# Patient Record
Sex: FEMALE | Race: WHITE | NOT HISPANIC OR LATINO
[De-identification: names, ages, dates, MRNs, and addresses within clinical notes are randomized per-mention and may not be internally consistent; named-entity substitution may affect disease eponyms.]

---
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
## 2021-09-09 PROBLEM — Z00.00 ENCOUNTER FOR PREVENTIVE HEALTH EXAMINATION: Status: ACTIVE | Noted: 2021-09-09

## 2022-07-10 ENCOUNTER — FORM ENCOUNTER (OUTPATIENT)
Age: 22
End: 2022-07-10

## 2022-07-11 ENCOUNTER — APPOINTMENT (OUTPATIENT)
Dept: ORTHOPEDIC SURGERY | Facility: CLINIC | Age: 22
End: 2022-07-11

## 2022-07-11 ENCOUNTER — APPOINTMENT (OUTPATIENT)
Dept: MRI IMAGING | Facility: CLINIC | Age: 22
End: 2022-07-11

## 2022-07-11 VITALS — WEIGHT: 160 LBS | BODY MASS INDEX: 27.31 KG/M2 | HEIGHT: 64 IN

## 2022-07-11 DIAGNOSIS — Z86.79 PERSONAL HISTORY OF OTHER DISEASES OF THE CIRCULATORY SYSTEM: ICD-10-CM

## 2022-07-11 DIAGNOSIS — Z78.9 OTHER SPECIFIED HEALTH STATUS: ICD-10-CM

## 2022-07-11 PROCEDURE — 73590 X-RAY EXAM OF LOWER LEG: CPT | Mod: RT

## 2022-07-11 PROCEDURE — E0114: CPT | Mod: NU

## 2022-07-11 PROCEDURE — 99204 OFFICE O/P NEW MOD 45 MIN: CPT

## 2022-07-11 PROCEDURE — 73718 MRI LOWER EXTREMITY W/O DYE: CPT | Mod: RT

## 2022-07-11 RX ORDER — DULOXETINE HCL 100 %
POWDER (GRAM) MISCELLANEOUS
Refills: 0 | Status: ACTIVE | COMMUNITY

## 2022-07-12 ENCOUNTER — APPOINTMENT (OUTPATIENT)
Dept: ORTHOPEDIC SURGERY | Facility: CLINIC | Age: 22
End: 2022-07-12

## 2022-07-12 VITALS — HEIGHT: 64 IN | BODY MASS INDEX: 27.31 KG/M2 | WEIGHT: 160 LBS

## 2022-07-12 PROCEDURE — 99214 OFFICE O/P EST MOD 30 MIN: CPT

## 2022-08-23 ENCOUNTER — APPOINTMENT (OUTPATIENT)
Dept: ORTHOPEDIC SURGERY | Facility: CLINIC | Age: 22
End: 2022-08-23

## 2022-08-23 VITALS — HEIGHT: 64 IN | WEIGHT: 160 LBS | BODY MASS INDEX: 27.31 KG/M2

## 2022-08-23 DIAGNOSIS — M79.605 PAIN IN LEFT LEG: ICD-10-CM

## 2022-08-23 DIAGNOSIS — T14.8XXA OTHER INJURY OF UNSPECIFIED BODY REGION, INITIAL ENCOUNTER: ICD-10-CM

## 2022-08-23 DIAGNOSIS — S89.92XA UNSPECIFIED INJURY OF LEFT LOWER LEG, INITIAL ENCOUNTER: ICD-10-CM

## 2022-08-23 PROCEDURE — 99214 OFFICE O/P EST MOD 30 MIN: CPT

## 2023-06-12 ENCOUNTER — FORM ENCOUNTER (OUTPATIENT)
Age: 23
End: 2023-06-12

## 2023-06-13 ENCOUNTER — APPOINTMENT (OUTPATIENT)
Dept: ORTHOPEDIC SURGERY | Facility: CLINIC | Age: 23
End: 2023-06-13
Payer: COMMERCIAL

## 2023-06-13 ENCOUNTER — APPOINTMENT (OUTPATIENT)
Dept: MRI IMAGING | Facility: CLINIC | Age: 23
End: 2023-06-13
Payer: COMMERCIAL

## 2023-06-13 VITALS — HEIGHT: 64 IN | WEIGHT: 160 LBS | BODY MASS INDEX: 27.31 KG/M2

## 2023-06-13 DIAGNOSIS — M25.561 PAIN IN RIGHT KNEE: ICD-10-CM

## 2023-06-13 PROCEDURE — 73564 X-RAY EXAM KNEE 4 OR MORE: CPT | Mod: RT

## 2023-06-13 PROCEDURE — 99214 OFFICE O/P EST MOD 30 MIN: CPT

## 2023-06-13 PROCEDURE — 73721 MRI JNT OF LWR EXTRE W/O DYE: CPT | Mod: RT

## 2023-06-13 NOTE — DATA REVIEWED
[FreeTextEntry1] : 6/13/23\par OC XRAY Right knee: 4 view:\par Patellar ruth ann, otherwise unremarkable.

## 2023-06-13 NOTE — HISTORY OF PRESENT ILLNESS
[de-identified] : The patient is a 23 year old R hand dominant female who presents today noting resolution of R knee pain. Related to Luke. \par Date of Injury/Onset: 06/09/2023\par Pain: At Rest: 3/10 \par With Activity: 8/10 \par Mechanism of injury: Landed on the involved leg diving playing volleyball\par This is not a Work Related Injury being treated under Worker's Compensation.\par This is not an athletic injury occurring associated with an interscholastic or organized sports team.\par Quality of symptoms: locking, sharp\par Improves with: N/A\par Worse with: Walking, knee flexion, stairs\par Treatment/Imaging since last visit: Ice, NSAIDs\par Reports available to review: N/A\par Out of work/sport: _, since\par School/Sport/Position/Occupation: Recreational volleyball \par Additional Information: None

## 2023-06-13 NOTE — PHYSICAL EXAM
[de-identified] : Neurovascularly intact distally \par \par Right Knee:  \par +Medial Lashell's\par +Locking at 10 degrees extension \par Full ROM\par Pain with full extension\par Medial buckling \par distal MCL tenderness

## 2023-06-13 NOTE — DISCUSSION/SUMMARY
[de-identified] : Reviewed all images with patient.\par STAT MRI of right knee to eval MMT.\par Follow up after MRI scan.\par \par \par \par -----------------------------------------------\par Home Exercise\par The patient is instructed on a home exercise program.\par \par LORAINE TEE Acting as a Scribe for Dr. Rasheed\par I, Loraine Tee, attest that this documentation has been prepared under the direction and in the presence of Provider Remy Rasheed MD.\par \par Activity Modification\par The patient was advised to modify their activities.\par \par Dx / Natural History\par The patient was advised of the diagnosis.  The natural history of the pathology was explained in full to the patient in layman's terms.  Several different treatment options were discussed and explained in full to the patient including the risks and benefits of both surgical and non-surgical treatments.  All questions and concerns were answered.\par \par Pain Guide Activities\par The patient was advised to let pain guide the gradual advancement of activities.\par \par RICE\par I explained to the patient that rest, ice, compression, and elevation would benefit them.  They may return to activity after follow-up or when they no longer have any pain.\par \par The patient's current medication management of their orthopedic diagnosis was reviewed today:\par (1) We discussed a comprehensive treatment plan that included possible pharmaceutical management involving the use of prescription strength medications including but not limited to options such as oral Naprosyn 500mg BID, once daily Meloxicam 15 mg, or 500-650 mg Tylenol versus over the counter oral medications and topical prescription NSAID Pennsaid vs over the counter Voltaren gel.\par (2) There is a moderate risk of morbidity with further treatment, especially from use of prescription strength medications and possible side effects of these medications which include upset stomach with oral medications, skin reactions to topical medications and cardiac/renal issues with long term use.\par (3) I recommended that the patient follow-up with their medical physician to discuss any significant specific potential issues with long term medication use such as interactions with current medications or with exacerbation of underlying medical comorbidities.\par (4) The benefits and risks associated with use of injectable, oral or topical, prescription and over the counter anti-inflammatory medications were discussed with the patient. The patient voiced understanding of the risks including but not limited to bleeding, stroke, kidney dysfunction, heart disease, and were referred to the black box warning label for further information. \par

## 2023-06-20 ENCOUNTER — APPOINTMENT (OUTPATIENT)
Dept: ORTHOPEDIC SURGERY | Facility: CLINIC | Age: 23
End: 2023-06-20
Payer: COMMERCIAL

## 2023-06-20 VITALS — HEIGHT: 64 IN | WEIGHT: 160 LBS | BODY MASS INDEX: 27.31 KG/M2

## 2023-06-20 DIAGNOSIS — M79.18 MYALGIA, OTHER SITE: ICD-10-CM

## 2023-06-20 DIAGNOSIS — S76.311D STRAIN OF MUSCLE, FASCIA AND TENDON OF THE POSTERIOR MUSCLE GROUP AT THIGH LEVEL, RIGHT THIGH, SUBSEQUENT ENCOUNTER: ICD-10-CM

## 2023-06-20 PROCEDURE — 99214 OFFICE O/P EST MOD 30 MIN: CPT

## 2023-06-20 PROCEDURE — L1833: CPT | Mod: RT

## 2023-06-20 PROCEDURE — L2397: CPT | Mod: RT

## 2023-06-20 NOTE — DISCUSSION/SUMMARY
[de-identified] : Reviewed all images with patient.\par Rx Playmaker brace.\par Physical therapy prescribed for strengthening and stretching. \par Patient will follow up in 6 weeks. \par \par \par \par -----------------------------------------------\par Home Exercise\par The patient is instructed on a home exercise program.\par \par LORAINE TEE Acting as a Scribe for Dr. Rasheed\par I, Loraine Tee, attest that this documentation has been prepared under the direction and in the presence of Provider Remy Rasheed MD.\par \par Activity Modification\par The patient was advised to modify their activities.\par \par Dx / Natural History\par The patient was advised of the diagnosis.  The natural history of the pathology was explained in full to the patient in layman's terms.  Several different treatment options were discussed and explained in full to the patient including the risks and benefits of both surgical and non-surgical treatments.  All questions and concerns were answered.\par \par Pain Guide Activities\par The patient was advised to let pain guide the gradual advancement of activities.\par \par RICE\par I explained to the patient that rest, ice, compression, and elevation would benefit them.  They may return to activity after follow-up or when they no longer have any pain.\par \par The patient's current medication management of their orthopedic diagnosis was reviewed today:\par (1) We discussed a comprehensive treatment plan that included possible pharmaceutical management involving the use of prescription strength medications including but not limited to options such as oral Naprosyn 500mg BID, once daily Meloxicam 15 mg, or 500-650 mg Tylenol versus over the counter oral medications and topical prescription NSAID Pennsaid vs over the counter Voltaren gel.\par (2) There is a moderate risk of morbidity with further treatment, especially from use of prescription strength medications and possible side effects of these medications which include upset stomach with oral medications, skin reactions to topical medications and cardiac/renal issues with long term use.\par (3) I recommended that the patient follow-up with their medical physician to discuss any significant specific potential issues with long term medication use such as interactions with current medications or with exacerbation of underlying medical comorbidities.\par (4) The benefits and risks associated with use of injectable, oral or topical, prescription and over the counter anti-inflammatory medications were discussed with the patient. The patient voiced understanding of the risks including but not limited to bleeding, stroke, kidney dysfunction, heart disease, and were referred to the black box warning label for further information. \par

## 2023-06-20 NOTE — HISTORY OF PRESENT ILLNESS
[de-identified] : The patient is a 23 year old R hand dominant female who presents today noting resolution of R knee pain. Related to Luke. \par Date of Injury/Onset: 06/09/2023\par Pain: At Rest: 5/10 \par With Activity: 6/10 \par Mechanism of injury: Landed on the involved leg diving playing volleyball\par This is not a Work Related Injury being treated under Worker's Compensation.\par This is not an athletic injury occurring associated with an interscholastic or organized sports team.\par Quality of symptoms: locking, sharp\par Improves with: N/A\par Worse with: Walking, knee flexion, stairs\par Treatment/Imaging since last visit: MRI (OCOA)\par Reports available to review: N/A\par Out of work/sport: _, since\par School/Sport/Position/Occupation: Recreational volleyball \par Additional Information: None

## 2023-06-20 NOTE — PHYSICAL EXAM
[de-identified] : Neurovascularly intact distally \par \par Right Knee:  \par +Medial Lashell's\par +Locking at 10 degrees extension \par Full ROM\par Pain with full extension\par Medial buckling \par distal MCL tenderness

## 2023-08-01 ENCOUNTER — APPOINTMENT (OUTPATIENT)
Dept: ORTHOPEDIC SURGERY | Facility: CLINIC | Age: 23
End: 2023-08-01

## 2023-08-29 ENCOUNTER — APPOINTMENT (OUTPATIENT)
Dept: OBGYN | Facility: CLINIC | Age: 23
End: 2023-08-29

## 2023-08-31 ENCOUNTER — APPOINTMENT (OUTPATIENT)
Dept: OBGYN | Facility: CLINIC | Age: 23
End: 2023-08-31